# Patient Record
Sex: FEMALE | Race: ASIAN
[De-identification: names, ages, dates, MRNs, and addresses within clinical notes are randomized per-mention and may not be internally consistent; named-entity substitution may affect disease eponyms.]

---

## 2017-11-06 ENCOUNTER — HOSPITAL ENCOUNTER (INPATIENT)
Dept: HOSPITAL 54 - ER | Age: 82
LOS: 5 days | Discharge: NURSING, REHAB OR LONG-TERM CARE | DRG: 264 | End: 2017-11-11
Attending: INTERNAL MEDICINE | Admitting: INTERNAL MEDICINE
Payer: MEDICARE

## 2017-11-06 VITALS — DIASTOLIC BLOOD PRESSURE: 92 MMHG | SYSTOLIC BLOOD PRESSURE: 158 MMHG

## 2017-11-06 VITALS — SYSTOLIC BLOOD PRESSURE: 145 MMHG | DIASTOLIC BLOOD PRESSURE: 84 MMHG

## 2017-11-06 VITALS — BODY MASS INDEX: 17.67 KG/M2 | HEIGHT: 60 IN | WEIGHT: 90 LBS

## 2017-11-06 VITALS — SYSTOLIC BLOOD PRESSURE: 142 MMHG | DIASTOLIC BLOOD PRESSURE: 78 MMHG

## 2017-11-06 DIAGNOSIS — E11.52: Primary | ICD-10-CM

## 2017-11-06 DIAGNOSIS — F39: ICD-10-CM

## 2017-11-06 DIAGNOSIS — G40.909: ICD-10-CM

## 2017-11-06 DIAGNOSIS — Z79.4: ICD-10-CM

## 2017-11-06 DIAGNOSIS — M81.0: ICD-10-CM

## 2017-11-06 DIAGNOSIS — M24.569: ICD-10-CM

## 2017-11-06 DIAGNOSIS — R53.2: ICD-10-CM

## 2017-11-06 DIAGNOSIS — Z79.899: ICD-10-CM

## 2017-11-06 DIAGNOSIS — I25.10: ICD-10-CM

## 2017-11-06 DIAGNOSIS — H26.9: ICD-10-CM

## 2017-11-06 DIAGNOSIS — E43: ICD-10-CM

## 2017-11-06 DIAGNOSIS — Z86.73: ICD-10-CM

## 2017-11-06 DIAGNOSIS — G92: ICD-10-CM

## 2017-11-06 DIAGNOSIS — F29: ICD-10-CM

## 2017-11-06 DIAGNOSIS — Z88.8: ICD-10-CM

## 2017-11-06 DIAGNOSIS — I10: ICD-10-CM

## 2017-11-06 DIAGNOSIS — D64.9: ICD-10-CM

## 2017-11-06 DIAGNOSIS — F03.90: ICD-10-CM

## 2017-11-06 DIAGNOSIS — Z51.5: ICD-10-CM

## 2017-11-06 LAB
ALBUMIN SERPL BCP-MCNC: 3.3 G/DL (ref 3.4–5)
ALP SERPL-CCNC: 87 U/L (ref 46–116)
ALT SERPL W P-5'-P-CCNC: 23 U/L (ref 12–78)
APTT PPP: 27 SEC (ref 23–34)
AST SERPL W P-5'-P-CCNC: 28 U/L (ref 15–37)
BASOPHILS # BLD AUTO: 0.1 /CMM (ref 0–0.2)
BASOPHILS NFR BLD AUTO: 1.6 % (ref 0–2)
BILIRUB DIRECT SERPL-MCNC: 0.1 MG/DL (ref 0–0.2)
BILIRUB SERPL-MCNC: 0.5 MG/DL (ref 0.2–1)
BUN SERPL-MCNC: 16 MG/DL (ref 7–18)
CALCIUM SERPL-MCNC: 9.8 MG/DL (ref 8.5–10.1)
CHLORIDE SERPL-SCNC: 101 MMOL/L (ref 98–107)
CO2 SERPL-SCNC: 33 MMOL/L (ref 21–32)
CREAT SERPL-MCNC: 0.6 MG/DL (ref 0.6–1.3)
EOSINOPHIL # BLD AUTO: 0.2 /CMM (ref 0–0.7)
EOSINOPHIL NFR BLD AUTO: 4.1 % (ref 0–6)
GLUCOSE SERPL-MCNC: 183 MG/DL (ref 74–106)
HCT VFR BLD AUTO: 38 % (ref 33–45)
HGB BLD-MCNC: 12.5 G/DL (ref 11.5–14.8)
INR PPP: 0.95 (ref 0.87–1.13)
LYMPHOCYTES NFR BLD AUTO: 1.4 /CMM (ref 0.8–4.8)
LYMPHOCYTES NFR BLD AUTO: 24 % (ref 20–44)
MCH RBC QN AUTO: 30 PG (ref 26–33)
MCHC RBC AUTO-ENTMCNC: 33 G/DL (ref 31–36)
MCV RBC AUTO: 91 FL (ref 82–100)
MONOCYTES NFR BLD AUTO: 0.2 /CMM (ref 0.1–1.3)
MONOCYTES NFR BLD AUTO: 4.1 % (ref 2–12)
NEUTROPHILS # BLD AUTO: 3.9 /CMM (ref 1.8–8.9)
NEUTROPHILS NFR BLD AUTO: 66.2 % (ref 43–81)
PLATELET # BLD AUTO: 297 /CMM (ref 150–450)
POTASSIUM SERPL-SCNC: 4.2 MMOL/L (ref 3.5–5.1)
PROT SERPL-MCNC: 7.6 G/DL (ref 6.4–8.2)
PROTHROMBIN TIME: 9.9 SECS (ref 9.5–12.7)
RBC # BLD AUTO: 4.13 MIL/UL (ref 4–5.2)
RDW COEFFICIENT OF VARIATION: 12.4 (ref 11.5–15)
SODIUM SERPL-SCNC: 140 MMOL/L (ref 136–145)
TROPONIN I SERPL-MCNC: < 0.017 NG/ML (ref 0–0.06)
WBC NRBC COR # BLD AUTO: 5.8 K/UL (ref 4.3–11)

## 2017-11-06 PROCEDURE — A6402 STERILE GAUZE <= 16 SQ IN: HCPCS

## 2017-11-06 PROCEDURE — A4216 STERILE WATER/SALINE, 10 ML: HCPCS

## 2017-11-06 PROCEDURE — A6253 ABSORPT DRG > 48 SQ IN W/O B: HCPCS

## 2017-11-06 PROCEDURE — A4606 OXYGEN PROBE USED W OXIMETER: HCPCS

## 2017-11-06 PROCEDURE — 05H633Z INSERTION OF INFUSION DEVICE INTO LEFT SUBCLAVIAN VEIN, PERCUTANEOUS APPROACH: ICD-10-PCS | Performed by: INTERNAL MEDICINE

## 2017-11-06 PROCEDURE — 0JBP0ZZ EXCISION OF LEFT LOWER LEG SUBCUTANEOUS TISSUE AND FASCIA, OPEN APPROACH: ICD-10-PCS | Performed by: PODIATRIST

## 2017-11-06 PROCEDURE — A6403 STERILE GAUZE>16 <= 48 SQ IN: HCPCS

## 2017-11-06 PROCEDURE — Z7610: HCPCS

## 2017-11-06 RX ADMIN — LEVETIRACETAM SCH MG: 250 TABLET, FILM COATED ORAL at 20:11

## 2017-11-06 RX ADMIN — Medication SCH MLS/HR: at 21:07

## 2017-11-06 RX ADMIN — Medication SCH ML: at 19:10

## 2017-11-06 RX ADMIN — CARVEDILOL SCH MG: 3.12 TABLET, FILM COATED ORAL at 19:10

## 2017-11-06 RX ADMIN — SIMVASTATIN SCH MG: 10 TABLET, FILM COATED ORAL at 21:07

## 2017-11-06 RX ADMIN — DEXTROSE MONOHYDRATE SCH MLS/HR: 50 INJECTION, SOLUTION INTRAVENOUS at 20:18

## 2017-11-06 RX ADMIN — Medication SCH MG: at 21:07

## 2017-11-06 RX ADMIN — INSULIN DETEMIR SCH UNIT: 100 INJECTION, SOLUTION SUBCUTANEOUS at 21:11

## 2017-11-06 RX ADMIN — LIDOCAINE HYDROCHLORIDE ONE MG: 10 INJECTION, SOLUTION INFILTRATION; PERINEURAL at 15:30

## 2017-11-06 RX ADMIN — HYDROMORPHONE HYDROCHLORIDE PRN MG: 1 INJECTION, SOLUTION INTRAMUSCULAR; INTRAVENOUS; SUBCUTANEOUS at 21:07

## 2017-11-06 RX ADMIN — LORAZEPAM PRN MG: 2 INJECTION INTRAMUSCULAR; INTRAVENOUS at 16:55

## 2017-11-06 NOTE — NUR
RN NOTE 

PATIENT ARRIVE TO THE UNIT ON GURNEY A/0X1-2  ITCHING AND SCRATCHING SCREAMING WITH 
UNIDENTIFIABLE LANGUAGE. PATIENT VITALS ASSESSED AND NO SOB NOTED , PAIN UNABLE TO BE SCALED 
HOWEVER THE PATIENT IS GRIMACING REAPEDALY AND YELLS WHEN LOWER LEFT EXTREMITY IS SLIGHTLY 
MOVED OR REPOSITIONED PATIENT IS ABLE TO MOVE HER SELF HOWEVER UNABLE TO FOLLOW COMMANDS 
PANT APPEAR ANIOUS AND THRUSH / WHITE COATING NOTED ON PATIENTS TONGUE PATIENT HAS MULTIPLE 
SKIN ISSUE : LEFT LOWER LEG WET GANGRENE [id number] TO DETERMINE DEPTH. PATIENT SCRACTHING 
SKIN ALL OVER AND BODY CAUSING FURTHER SKIN IRRITATION. PICTURES TAKEN AND PLACE IN THE 
CHART , WOUND CARE NURSE BLAINE AND HUSEYIN PUTNAM HELPED ASSIST WITH INITIAL INTAKE. PATIENT SAFTEY 
MEASURES IN PLACE RN SITTING AT BEDSIDE

## 2017-11-06 NOTE — NUR
RN CLOSING NOTE 

PATIENT  IN BED RA NO SOB NOTED PATIENT GIVEN PM B/P MEDICATION DAYSHIFT STILL AWAITING 
FURTHER MEDICATION VIA PHARMACY RN WILL ENDORSE FURTHER CARE TO PM RN , PATIENT IS RESTING 
AFTER DEBRIBMENT MEAL TRAY ON BEDSIDE TABLE TO GIVE TO PATIENT AFTER SHE AWAKENS

## 2017-11-06 NOTE — NUR
WILLIAN FROM [facility] DT RIGHT LEG OPEN WOUND POSSIBLE GANGRENE, 
PATIENT WAS SENT BY MD DAILEY.  RIGHT LEG WOUND NOTED WITH GREENISH 
DISCHARGE. SKIN IS WARM TO TOUCH AND NON DIAPHORETIC. AFEBRILE. VSS

## 2017-11-06 NOTE — NUR
MS RN NOTE



IV TO RHAND LEAKING. NEW IV STARTED #22 SHAKA. PATIENT TOLERATED WELL. WILL CONTINUE TO 
MONITOR.

## 2017-11-06 NOTE — NUR
MS RN NOTE



RECEIVED PATIENT AWAKE IN BED. VERY CONFUSED Romanian SPEAKING. NO DISTRESS NOTED. IV SITE TO 
RIGHT HAND INTACT, WITH FLUIDS RUNNING AS ORDERED.DRESSING TO LEFT LEG C/D/I. BED LOCKED AND 
IN LOWEST POSITION. SIDE RAILS UP, CALL LIGHT WITHIN REACH. WILL CONTINUE TO MONITOR.

## 2017-11-06 NOTE — NUR
MS RN NOTE



PATIENT HAS FACIAL GRIMACING. SON AT BEDSIDE. STATES THAT PATIENT IS HAVING 8/10 PAIN TO 
LEFT LEG DEBRIDEMENT SITE. NEW ORDER RECEIVED FOR DILAUDID 0.5MG Q8H FOR SEVERE PAIN 
RECEIVED AND CARRIED OUT. ADMINISTERED DILAUDID TO PATIENT SAFELY AS ORDERED. WILL CONTINUE 
TO MONITOR.

## 2017-11-07 VITALS — DIASTOLIC BLOOD PRESSURE: 70 MMHG | SYSTOLIC BLOOD PRESSURE: 178 MMHG

## 2017-11-07 VITALS — DIASTOLIC BLOOD PRESSURE: 58 MMHG | SYSTOLIC BLOOD PRESSURE: 173 MMHG

## 2017-11-07 VITALS — SYSTOLIC BLOOD PRESSURE: 153 MMHG | DIASTOLIC BLOOD PRESSURE: 62 MMHG

## 2017-11-07 VITALS — DIASTOLIC BLOOD PRESSURE: 73 MMHG | SYSTOLIC BLOOD PRESSURE: 134 MMHG

## 2017-11-07 LAB
BUN SERPL-MCNC: 16 MG/DL (ref 7–18)
CALCIUM SERPL-MCNC: 8.7 MG/DL (ref 8.5–10.1)
CHLORIDE SERPL-SCNC: 102 MMOL/L (ref 98–107)
CO2 SERPL-SCNC: 31 MMOL/L (ref 21–32)
CREAT SERPL-MCNC: 0.9 MG/DL (ref 0.6–1.3)
GLUCOSE SERPL-MCNC: 144 MG/DL (ref 74–106)
POTASSIUM SERPL-SCNC: 4.4 MMOL/L (ref 3.5–5.1)
SODIUM SERPL-SCNC: 141 MMOL/L (ref 136–145)

## 2017-11-07 RX ADMIN — Medication SCH MG: at 21:16

## 2017-11-07 RX ADMIN — Medication SCH TAB: at 09:15

## 2017-11-07 RX ADMIN — Medication SCH ML: at 14:37

## 2017-11-07 RX ADMIN — INSULIN DETEMIR SCH UNIT: 100 INJECTION, SOLUTION SUBCUTANEOUS at 18:33

## 2017-11-07 RX ADMIN — DEXTROSE MONOHYDRATE SCH MLS/HR: 50 INJECTION, SOLUTION INTRAVENOUS at 06:30

## 2017-11-07 RX ADMIN — SIMVASTATIN SCH MG: 10 TABLET, FILM COATED ORAL at 21:16

## 2017-11-07 RX ADMIN — LORAZEPAM PRN MG: 2 INJECTION INTRAMUSCULAR; INTRAVENOUS at 22:24

## 2017-11-07 RX ADMIN — HYDROMORPHONE HYDROCHLORIDE PRN MG: 1 INJECTION, SOLUTION INTRAMUSCULAR; INTRAVENOUS; SUBCUTANEOUS at 22:22

## 2017-11-07 RX ADMIN — POVIDONE-IODINE SCH GM: 100 OINTMENT TOPICAL at 11:07

## 2017-11-07 RX ADMIN — LEVETIRACETAM SCH MG: 250 TABLET, FILM COATED ORAL at 21:20

## 2017-11-07 RX ADMIN — Medication SCH ML: at 11:04

## 2017-11-07 RX ADMIN — LEVETIRACETAM SCH MG: 250 TABLET, FILM COATED ORAL at 09:15

## 2017-11-07 RX ADMIN — CARVEDILOL SCH MG: 3.12 TABLET, FILM COATED ORAL at 09:16

## 2017-11-07 RX ADMIN — Medication SCH MLS/HR: at 13:00

## 2017-11-07 RX ADMIN — Medication SCH MLS/HR: at 04:08

## 2017-11-07 RX ADMIN — Medication SCH OZ: at 09:19

## 2017-11-07 RX ADMIN — DEXTROSE MONOHYDRATE SCH MLS/HR: 50 INJECTION, SOLUTION INTRAVENOUS at 22:28

## 2017-11-07 RX ADMIN — CARVEDILOL SCH MG: 3.12 TABLET, FILM COATED ORAL at 17:04

## 2017-11-07 RX ADMIN — INSULIN DETEMIR SCH UNIT: 100 INJECTION, SOLUTION SUBCUTANEOUS at 06:31

## 2017-11-07 RX ADMIN — Medication SCH MLS/HR: at 23:03

## 2017-11-07 RX ADMIN — LISINOPRIL SCH MG: 10 TABLET ORAL at 09:16

## 2017-11-07 RX ADMIN — Medication SCH ML: at 17:02

## 2017-11-07 RX ADMIN — POVIDONE-IODINE SCH GM: 100 OINTMENT TOPICAL at 17:01

## 2017-11-07 NOTE — NUR
PT. PULLED OUT IV,CHARGE RN NOTIFIED,SUPERVISOR CALLED FOR MIDLINE INSERTION-MEANWHILE PT. 
NEEDS ANTIBIOTICS-SO FLOOR RN ATTEMPTED PERIPHERAL INSERTION-TO NO AVAIL.

## 2017-11-07 NOTE — NUR
RN NOTES,



PATIENT RECEIVED IN BED SLEEPING AT THIS TIME, RA BREATHING EVEN AND UNLABORED NO S/S OF 
PAIN OR DISCOMFORT AT THIS TIME.  AFEBRILE, AT THIS TIME, NO PIV LINE IN PLACE PER RN DAY 
SHIFT A NURSE IS COMING TO INSERT A MIDLINE. NOTED WITH MULTIPLE CONTRACTURES. BED LOCKED IN 
LOWEST POSITION, NOTED CLEAN AND DRY AT THIS TIME. DRESSING IN LEFT FOOT DRY AND CLEAN, NO 
BLEEDING NOTED. CALL LIGHT W/I REACH, WILL CONTINUE TO MONITOR CLOSELY.

## 2017-11-07 NOTE — NUR
Patient resides at [facility]  [phone], he is totally dependent with adl's and 
bedfast most of the time. Current plan is to dc back to SNF once discharge. 


-------------------------------------------------------------------------------

Addendum: 11/07/17 at 1347 by VITA LEE RN

-------------------------------------------------------------------------------

Amended: Links added.

## 2017-11-07 NOTE — NUR
WOUND CARE CONSULT: PT PRESENTS WITH LEFT LOWER LEG NECROTIC WOUND WITH ODOR AND PURULENT 
DRAINAGE. PT FOLLOWED BY DR DUEÑAS. DEFER TO DPM FOR  TREATMENT PLAN OF LOWER 
EXTREMITIES. PT IS VERY THIN AND BONY WITH MULTIPLE AREAS OF SCARRING AND SCRATCH MARKS. PT 
NOTED TO SCRATCH HER SKIN AT TIMES. PT ON ALICIA ISOFLEX LOW AIRLOSS BED. ALL SKIN 
PROTECTION MEASURES IN PLACE AND DISCUSSED WITH NURSING STAFF. WILL SEE PRN. MD IN AGREEMENT 
WITH PLAN OF CARE.

## 2017-11-08 VITALS — DIASTOLIC BLOOD PRESSURE: 62 MMHG | SYSTOLIC BLOOD PRESSURE: 149 MMHG

## 2017-11-08 VITALS — SYSTOLIC BLOOD PRESSURE: 156 MMHG | DIASTOLIC BLOOD PRESSURE: 52 MMHG

## 2017-11-08 VITALS — DIASTOLIC BLOOD PRESSURE: 80 MMHG | SYSTOLIC BLOOD PRESSURE: 164 MMHG

## 2017-11-08 VITALS — DIASTOLIC BLOOD PRESSURE: 71 MMHG | SYSTOLIC BLOOD PRESSURE: 163 MMHG

## 2017-11-08 VITALS — SYSTOLIC BLOOD PRESSURE: 149 MMHG | DIASTOLIC BLOOD PRESSURE: 62 MMHG

## 2017-11-08 LAB
BUN SERPL-MCNC: 15 MG/DL (ref 7–18)
CALCIUM SERPL-MCNC: 8.8 MG/DL (ref 8.5–10.1)
CHLORIDE SERPL-SCNC: 104 MMOL/L (ref 98–107)
CO2 SERPL-SCNC: 33 MMOL/L (ref 21–32)
CREAT SERPL-MCNC: 0.7 MG/DL (ref 0.6–1.3)
GLUCOSE SERPL-MCNC: 47 MG/DL (ref 74–106)
POTASSIUM SERPL-SCNC: 3.4 MMOL/L (ref 3.5–5.1)
SODIUM SERPL-SCNC: 144 MMOL/L (ref 136–145)

## 2017-11-08 RX ADMIN — LACTOBACILLUS TAB SCH EACH: TAB at 18:21

## 2017-11-08 RX ADMIN — POTASSIUM CHLORIDE SCH MLS/HR: 200 INJECTION, SOLUTION INTRAVENOUS at 13:46

## 2017-11-08 RX ADMIN — Medication SCH MLS/HR: at 06:17

## 2017-11-08 RX ADMIN — LORAZEPAM PRN MG: 2 INJECTION INTRAMUSCULAR; INTRAVENOUS at 16:20

## 2017-11-08 RX ADMIN — Medication SCH DROP: at 16:21

## 2017-11-08 RX ADMIN — HYDROMORPHONE HYDROCHLORIDE PRN MG: 1 INJECTION, SOLUTION INTRAMUSCULAR; INTRAVENOUS; SUBCUTANEOUS at 20:12

## 2017-11-08 RX ADMIN — SODIUM CHLORIDE SCH MLS/HR: 9 INJECTION, SOLUTION INTRAVENOUS at 18:22

## 2017-11-08 RX ADMIN — INSULIN DETEMIR SCH UNIT: 100 INJECTION, SOLUTION SUBCUTANEOUS at 07:30

## 2017-11-08 RX ADMIN — Medication SCH TAB: at 09:55

## 2017-11-08 RX ADMIN — LEVETIRACETAM SCH MG: 250 TABLET, FILM COATED ORAL at 09:55

## 2017-11-08 RX ADMIN — Medication SCH MLS/HR: at 13:06

## 2017-11-08 RX ADMIN — POVIDONE-IODINE SCH GM: 100 OINTMENT TOPICAL at 10:00

## 2017-11-08 RX ADMIN — CARVEDILOL SCH MG: 3.12 TABLET, FILM COATED ORAL at 09:56

## 2017-11-08 RX ADMIN — LISINOPRIL SCH MG: 10 TABLET ORAL at 09:55

## 2017-11-08 RX ADMIN — POVIDONE-IODINE SCH GM: 100 OINTMENT TOPICAL at 16:28

## 2017-11-08 RX ADMIN — SIMVASTATIN SCH MG: 10 TABLET, FILM COATED ORAL at 21:32

## 2017-11-08 RX ADMIN — Medication SCH OZ: at 09:57

## 2017-11-08 RX ADMIN — Medication SCH DROP: at 09:57

## 2017-11-08 RX ADMIN — Medication SCH DROP: at 13:07

## 2017-11-08 RX ADMIN — Medication SCH MG: at 21:32

## 2017-11-08 RX ADMIN — INSULIN DETEMIR SCH UNIT: 100 INJECTION, SOLUTION SUBCUTANEOUS at 18:00

## 2017-11-08 RX ADMIN — LEVETIRACETAM SCH MG: 250 TABLET, FILM COATED ORAL at 20:12

## 2017-11-08 RX ADMIN — POTASSIUM CHLORIDE SCH MLS/HR: 200 INJECTION, SOLUTION INTRAVENOUS at 15:10

## 2017-11-08 RX ADMIN — CARVEDILOL SCH MG: 3.12 TABLET, FILM COATED ORAL at 16:20

## 2017-11-08 RX ADMIN — DEXTROSE MONOHYDRATE SCH MLS/HR: 50 INJECTION, SOLUTION INTRAVENOUS at 01:20

## 2017-11-08 NOTE — NUR
RN EOS NOTE;

PT REMAINED STABLE DURING THE SHIFT. NO ANY DISTRESS NOTED, ALL DUE MEDS TOLERATED WELL. 
ENDORSE TO NEXT SHIFT RN FOR CONTINUITY OF CARE.

## 2017-11-08 NOTE — NUR
RN NOTE;

RECEIVED RESTRAINTS ORDER FROM DR MILLIGAN FOR B/L MITTENS DUE TO PATIENT SCRATCHING AND 
PULLING OUT LINES NECESSARY FOR TREATMENT , WILL CHECK FOR CIRCULATION .

## 2017-11-08 NOTE — NUR
RN NOTES



PATIENT ON BED. BREATHING EVEN AND UNLABORED. AOX1 TO NAME, CONFUSED MOST OF THE TIME.  WITH 
IV SITE ON RFA G22 AND SHAKA MIDLINE INTACT AND PATENT AND FLUSHED WELL.LEFT LEG ULCER  SOAKED 
WITH  WOUND DRAINAGE AND SUNK FROM THE WOUND CHANGED AS ORDERED TX. PT IS RESTLESS ON BED, 
SCREAMED AND YELLED WHILE TAKING VS. SBP SHOWS 180'S AT 1940 PM. AND [facility] 
 PAIN MEDICINE GIVEN AS ORDERED  DUE TO LEFT LOWER LEG GANGRENE. AND PT STARTED TO CALM 
DOWN. BP RECHECK AFTER  15 MINUTES EFFECTIVE SBP NOW . PT ASLEEP AT THIS TIME  WITH 
ADEQ. OXYGENATION. OFFLOADED EXT WITH PILLOWS. BILATERAL MITTENS CONTINUE TO RENDERED DUE TO 
EPISODE OF PULLING OUT WHEN AWAKE. KEP CLEAN AND DRY WILL CONTINUE TO MONITOR.

## 2017-11-08 NOTE — NUR
RN NOTE; HELD LEVEMIR

INSULIN LEVEMIR 12 UNIT HELD DUE TO EPISODE OF HYPOGLYCEMIA BS- 52 MG/DL IN AM , DEXTROSE 
50% IV GIVEN BY PREVIOUS SHIFT RN , RECHECKED BS - 261 MG/DL . WILL CONTINUE TO MONITOR .

## 2017-11-08 NOTE — NUR
RN NOTES



PT REMAINED IN STABLE CONDITION THROUGHOUT THE SHIFT AFTER PRN MEDICINE GIVEN AS ORDERED. PT 
ASLEEP WELL  BREATHING EVEN AND UNLABORED. NO FACIAL COMPLAIN OF PAIN AT 0655 OSIEL  FROM LAB 
CALLED AND REPORT REGARDING THE CRITICAL VALUE OF GLUCOSE 47 MG/DL. MANUAL ACCUCHECK DONE  
BS 52 MG/DL.  OFFERED JUICE AND SNACK BU PT REFUSED TO EAT. RESPONSIVE SCREAMING, AND 
YELLING WHEN TRYING TO GIVE FOOD OR JUICE. PAGED MD  AND WAITED TO CALL BACK.  REPORT TO 
CHARGE NURSE AND  D50 GIVEN AS PROTOCOL. ENDORSED TO AM NURSE TO FOLLOW UP MD AND TO RECHECK 
BS.

## 2017-11-09 VITALS — DIASTOLIC BLOOD PRESSURE: 61 MMHG | SYSTOLIC BLOOD PRESSURE: 131 MMHG

## 2017-11-09 VITALS — DIASTOLIC BLOOD PRESSURE: 76 MMHG | SYSTOLIC BLOOD PRESSURE: 147 MMHG

## 2017-11-09 VITALS — SYSTOLIC BLOOD PRESSURE: 159 MMHG | DIASTOLIC BLOOD PRESSURE: 64 MMHG

## 2017-11-09 LAB
BUN SERPL-MCNC: 14 MG/DL (ref 7–18)
CALCIUM SERPL-MCNC: 8.9 MG/DL (ref 8.5–10.1)
CHLORIDE SERPL-SCNC: 103 MMOL/L (ref 98–107)
CO2 SERPL-SCNC: 29 MMOL/L (ref 21–32)
CREAT SERPL-MCNC: 0.8 MG/DL (ref 0.6–1.3)
GLUCOSE SERPL-MCNC: 137 MG/DL (ref 74–106)
POTASSIUM SERPL-SCNC: 3.9 MMOL/L (ref 3.5–5.1)
SODIUM SERPL-SCNC: 143 MMOL/L (ref 136–145)

## 2017-11-09 RX ADMIN — Medication SCH DROP: at 18:46

## 2017-11-09 RX ADMIN — Medication SCH DROP: at 10:33

## 2017-11-09 RX ADMIN — INSULIN DETEMIR SCH UNIT: 100 INJECTION, SOLUTION SUBCUTANEOUS at 07:30

## 2017-11-09 RX ADMIN — LEVETIRACETAM SCH MG: 250 TABLET, FILM COATED ORAL at 10:30

## 2017-11-09 RX ADMIN — SIMVASTATIN SCH MG: 10 TABLET, FILM COATED ORAL at 21:32

## 2017-11-09 RX ADMIN — SODIUM CHLORIDE SCH MLS/HR: 9 INJECTION, SOLUTION INTRAVENOUS at 03:53

## 2017-11-09 RX ADMIN — LEVETIRACETAM SCH MG: 250 TABLET, FILM COATED ORAL at 21:32

## 2017-11-09 RX ADMIN — Medication SCH DROP: at 13:28

## 2017-11-09 RX ADMIN — POVIDONE-IODINE SCH GM: 100 OINTMENT TOPICAL at 18:46

## 2017-11-09 RX ADMIN — Medication SCH OZ: at 10:33

## 2017-11-09 RX ADMIN — LEVOFLOXACIN SCH MG: 250 TABLET, FILM COATED ORAL at 18:45

## 2017-11-09 RX ADMIN — HYDROMORPHONE HYDROCHLORIDE PRN MG: 1 INJECTION, SOLUTION INTRAMUSCULAR; INTRAVENOUS; SUBCUTANEOUS at 19:50

## 2017-11-09 RX ADMIN — LACTOBACILLUS TAB SCH EACH: TAB at 13:28

## 2017-11-09 RX ADMIN — LACTOBACILLUS TAB SCH EACH: TAB at 10:31

## 2017-11-09 RX ADMIN — SODIUM CHLORIDE SCH MLS/HR: 9 INJECTION, SOLUTION INTRAVENOUS at 18:44

## 2017-11-09 RX ADMIN — SODIUM CHLORIDE SCH MLS/HR: 9 INJECTION, SOLUTION INTRAVENOUS at 11:44

## 2017-11-09 RX ADMIN — Medication SCH MG: at 21:32

## 2017-11-09 RX ADMIN — INSULIN DETEMIR SCH UNIT: 100 INJECTION, SOLUTION SUBCUTANEOUS at 18:47

## 2017-11-09 RX ADMIN — CARVEDILOL SCH MG: 3.12 TABLET, FILM COATED ORAL at 10:31

## 2017-11-09 RX ADMIN — LISINOPRIL SCH MG: 10 TABLET ORAL at 10:31

## 2017-11-09 RX ADMIN — Medication SCH TAB: at 10:30

## 2017-11-09 RX ADMIN — CARVEDILOL SCH MG: 3.12 TABLET, FILM COATED ORAL at 18:45

## 2017-11-09 RX ADMIN — POVIDONE-IODINE SCH GM: 100 OINTMENT TOPICAL at 10:32

## 2017-11-09 RX ADMIN — LACTOBACILLUS TAB SCH EACH: TAB at 18:45

## 2017-11-09 NOTE — NUR
NAVYA MS RN AM NOTES,



PATIENT RECEIVED IN BED AWAKE AND RESTLESS IN BED.RA BREATHING EVEN AND UNLABORED NO S/S OF 
PAIN OR DISCOMFORT AT THIS TIME.  AFEBRILE, AT THIS TIME, NOTED WITH MULTIPLE CONTRACTURES. 
BED LOCKED IN LOWEST POSITION, NOTED CLEAN AND DRY AT THIS TIME. DRESSING IN LEFT FOOT DRY 
AND CLEAN, NO BLEEDING NOTED. CALL LIGHT W/IN REACH, WILL CONTINUE TO MONITOR CLOSELY.

## 2017-11-09 NOTE — NUR
PT RESTING IN BED WITH NO S/S OF PAIN OR DISTRESS.RESTLESS EPISODE OCCASIONALLY.WITH 
BILATERAL MITTENS ON AND CHECKED FOR CIRCULATION EVERY TWO HRS.

## 2017-11-09 NOTE — NUR
RN/MS NOTES:



RECEIVED PATIENT AWAKE IN BED. VERY CONFUSED Slovak SPEAKING JUST MUMBLES. NO FACIAL BONILLA 
OR MOANING NOTED.  NOT IN ANY RESPIRATORY DISTRESS NOTED. IV SITE TO RIGHT FA INTACT AND 
PATENT W/ NO S/S OF INFECTION/INFILTRATION  NOTED. HAS SHAKA MID LINE INTACT. DRESSING TO LEFT 
LEG C/D/I. BED LOCKED AND IN LOWEST POSITION. SIDE RAILS UP, CALL LIGHT WITHIN REACH. HAS 
BILATERAL MITTENS INPLACE. WILL CONTINUE TO MONITOR.

## 2017-11-09 NOTE — NUR
RN NOTES



PATIENT ASLEEP WELL ON BED. BREATHING EVEN AND UNLABORED. NO SIGNIFICANT CHANGES SHOWS.  
WOUND DRESSING CHANGE AS ORDERED TX. AFEBRILE. VS CONTROLLED WITH MEDS. STILL WITH POOR 
APPETITE OFFERED FOOD INEFFECTIVE. TURNED AND REPOSITIONED  Q2H AND PRN AS PT COMFORTABLE. 
BILATERAL MITTENS IN PLACED. BED LOCKED AND PLACED IN LOWEST POSSIBLE POSITION. KEPT CLEAN 
AND DRY. WILL ENDORSED CONTINUITY OF CARE TO AM NURSE. AND TO FOLLOW UP MD REGARDING THE 
DISCHARGE OF PATIENT PER SON

## 2017-11-10 VITALS — SYSTOLIC BLOOD PRESSURE: 163 MMHG | DIASTOLIC BLOOD PRESSURE: 41 MMHG

## 2017-11-10 VITALS — DIASTOLIC BLOOD PRESSURE: 56 MMHG | SYSTOLIC BLOOD PRESSURE: 127 MMHG

## 2017-11-10 VITALS — DIASTOLIC BLOOD PRESSURE: 53 MMHG | SYSTOLIC BLOOD PRESSURE: 125 MMHG

## 2017-11-10 VITALS — SYSTOLIC BLOOD PRESSURE: 148 MMHG | DIASTOLIC BLOOD PRESSURE: 80 MMHG

## 2017-11-10 LAB
BUN SERPL-MCNC: 20 MG/DL (ref 7–18)
CALCIUM SERPL-MCNC: 9 MG/DL (ref 8.5–10.1)
CHLORIDE SERPL-SCNC: 103 MMOL/L (ref 98–107)
CO2 SERPL-SCNC: 32 MMOL/L (ref 21–32)
CREAT SERPL-MCNC: 0.8 MG/DL (ref 0.6–1.3)
GLUCOSE SERPL-MCNC: 162 MG/DL (ref 74–106)
POTASSIUM SERPL-SCNC: 3.7 MMOL/L (ref 3.5–5.1)
SODIUM SERPL-SCNC: 142 MMOL/L (ref 136–145)

## 2017-11-10 RX ADMIN — INSULIN DETEMIR SCH UNIT: 100 INJECTION, SOLUTION SUBCUTANEOUS at 07:30

## 2017-11-10 RX ADMIN — SIMVASTATIN SCH MG: 10 TABLET, FILM COATED ORAL at 21:09

## 2017-11-10 RX ADMIN — POVIDONE-IODINE SCH GM: 100 OINTMENT TOPICAL at 17:25

## 2017-11-10 RX ADMIN — INSULIN DETEMIR SCH UNIT: 100 INJECTION, SOLUTION SUBCUTANEOUS at 17:43

## 2017-11-10 RX ADMIN — Medication SCH DROP: at 09:52

## 2017-11-10 RX ADMIN — SODIUM CHLORIDE SCH MLS/HR: 9 INJECTION, SOLUTION INTRAVENOUS at 17:23

## 2017-11-10 RX ADMIN — LEVETIRACETAM SCH MG: 250 TABLET, FILM COATED ORAL at 09:58

## 2017-11-10 RX ADMIN — Medication SCH DROP: at 13:07

## 2017-11-10 RX ADMIN — LEVOFLOXACIN SCH MG: 250 TABLET, FILM COATED ORAL at 17:25

## 2017-11-10 RX ADMIN — SODIUM CHLORIDE SCH MLS/HR: 9 INJECTION, SOLUTION INTRAVENOUS at 09:57

## 2017-11-10 RX ADMIN — CARVEDILOL SCH MG: 3.12 TABLET, FILM COATED ORAL at 17:26

## 2017-11-10 RX ADMIN — LEVETIRACETAM SCH MG: 250 TABLET, FILM COATED ORAL at 21:09

## 2017-11-10 RX ADMIN — LISINOPRIL SCH MG: 10 TABLET ORAL at 09:59

## 2017-11-10 RX ADMIN — POVIDONE-IODINE SCH GM: 100 OINTMENT TOPICAL at 09:51

## 2017-11-10 RX ADMIN — Medication SCH TAB: at 09:58

## 2017-11-10 RX ADMIN — LACTOBACILLUS TAB SCH EACH: TAB at 09:58

## 2017-11-10 RX ADMIN — Medication SCH DROP: at 17:25

## 2017-11-10 RX ADMIN — Medication SCH MG: at 21:09

## 2017-11-10 RX ADMIN — Medication SCH OZ: at 09:51

## 2017-11-10 RX ADMIN — SODIUM CHLORIDE SCH MLS/HR: 9 INJECTION, SOLUTION INTRAVENOUS at 01:10

## 2017-11-10 RX ADMIN — CARVEDILOL SCH MG: 3.12 TABLET, FILM COATED ORAL at 09:59

## 2017-11-10 RX ADMIN — LACTOBACILLUS TAB SCH EACH: TAB at 13:07

## 2017-11-10 RX ADMIN — LACTOBACILLUS TAB SCH EACH: TAB at 17:25

## 2017-11-10 NOTE — NUR
RN OPEN NOTES



RECEIVED REPORT FROM NIGHT SHIFT NURSE. WILL CONTINUE TO MONITOR AND ASSESS PATIEN THROUGH 
OUT MY SHIFT

## 2017-11-10 NOTE — NUR
RN CLOSING NOTES



PATIENT IS IN BED. ALERT AND ORIENTED TO SELF ONLY. NO SIGNS AND SYMPTOMS OF DISTRESS OR 
PAIN. IV SITE IS INTACT AND PATENT. AL NURSING CARE ATTENDED. PATIENT KEPT CLEAN AND DRY. 
ENDORSED TO NIGHT SHIFT NURSE.

## 2017-11-10 NOTE — NUR
RN/MS NOTES:



PT. IN BED SLEEPING W/ RESPIRATIONS EVEN AND UNLABORED. NO FACIAL GRIMACES OR MOANING NOTED. 
REPORT GIVEN TO NEXT SHIFT NURSE FOR CONTINUE OF CARE.

## 2017-11-10 NOTE — NUR
RN/MS NOTES:



RECEIVED PATIENT AWAKE IN BED. VERY CONFUSED Tajik SPEAKING JUST MUMBLES. NO FACIAL BONILLA 
OR MOANING NOTED.  NOT IN ANY RESPIRATORY DISTRESS NOTED. IV SITE TO RIGHT FA INTACT AND 
PATENT W/ NO S/S OF INFECTION/INFILTRATION  NOTED. HAS SHAKA MID LINE INTACT. DRESSING TO LEFT 
LEG C/D/I. BED LOCKED AND IN LOWEST POSITION. SIDE RAILS UP, CALL LIGHT WITHIN REACH. HAS 
BILATERAL MITTENS INPLACE. WILL CONTINUE TO MONITOR.

## 2017-11-11 VITALS — SYSTOLIC BLOOD PRESSURE: 141 MMHG | DIASTOLIC BLOOD PRESSURE: 52 MMHG

## 2017-11-11 VITALS — DIASTOLIC BLOOD PRESSURE: 57 MMHG | SYSTOLIC BLOOD PRESSURE: 162 MMHG

## 2017-11-11 VITALS — SYSTOLIC BLOOD PRESSURE: 162 MMHG | DIASTOLIC BLOOD PRESSURE: 57 MMHG

## 2017-11-11 LAB
BUN SERPL-MCNC: 13 MG/DL (ref 7–18)
CALCIUM SERPL-MCNC: 8.6 MG/DL (ref 8.5–10.1)
CHLORIDE SERPL-SCNC: 103 MMOL/L (ref 98–107)
CO2 SERPL-SCNC: 34 MMOL/L (ref 21–32)
CREAT SERPL-MCNC: 0.6 MG/DL (ref 0.6–1.3)
GLUCOSE SERPL-MCNC: 62 MG/DL (ref 74–106)
POTASSIUM SERPL-SCNC: 3.3 MMOL/L (ref 3.5–5.1)
SODIUM SERPL-SCNC: 145 MMOL/L (ref 136–145)

## 2017-11-11 RX ADMIN — Medication SCH TAB: at 08:39

## 2017-11-11 RX ADMIN — LISINOPRIL SCH MG: 10 TABLET ORAL at 08:39

## 2017-11-11 RX ADMIN — CARVEDILOL SCH MG: 3.12 TABLET, FILM COATED ORAL at 08:40

## 2017-11-11 RX ADMIN — SODIUM CHLORIDE, SODIUM LACTATE, POTASSIUM CHLORIDE, AND CALCIUM CHLORIDE SCH MLS/HR: .6; .31; .03; .02 INJECTION, SOLUTION INTRAVENOUS at 15:35

## 2017-11-11 RX ADMIN — Medication SCH DROP: at 08:40

## 2017-11-11 RX ADMIN — LEVETIRACETAM SCH MG: 250 TABLET, FILM COATED ORAL at 08:39

## 2017-11-11 RX ADMIN — POVIDONE-IODINE SCH GM: 100 OINTMENT TOPICAL at 08:40

## 2017-11-11 RX ADMIN — SODIUM CHLORIDE, SODIUM LACTATE, POTASSIUM CHLORIDE, AND CALCIUM CHLORIDE SCH MLS/HR: .6; .31; .03; .02 INJECTION, SOLUTION INTRAVENOUS at 14:10

## 2017-11-11 RX ADMIN — LACTOBACILLUS TAB SCH EACH: TAB at 08:39

## 2017-11-11 RX ADMIN — LORAZEPAM PRN MG: 2 INJECTION INTRAMUSCULAR; INTRAVENOUS at 16:15

## 2017-11-11 RX ADMIN — SODIUM CHLORIDE SCH MLS/HR: 9 INJECTION, SOLUTION INTRAVENOUS at 08:40

## 2017-11-11 RX ADMIN — Medication SCH DROP: at 12:20

## 2017-11-11 RX ADMIN — INSULIN DETEMIR SCH UNIT: 100 INJECTION, SOLUTION SUBCUTANEOUS at 07:30

## 2017-11-11 RX ADMIN — SODIUM CHLORIDE SCH MLS/HR: 9 INJECTION, SOLUTION INTRAVENOUS at 01:00

## 2017-11-11 RX ADMIN — LACTOBACILLUS TAB SCH EACH: TAB at 12:20

## 2017-11-11 RX ADMIN — Medication SCH OZ: at 08:40

## 2017-11-11 NOTE — NUR
RN CLOSING NOTE

PATIENT DISCHARGED. LEFT VIA AMBULANCE. REPORT CALLED TO DEMETRIUS AT [facility].

## 2017-11-11 NOTE — NUR
RN/MS NOTES:



PT. IN BED RESTING. ALERT TO SELF. NO S/S OF FACIAL GRIMACE OR MOANING NOTED. CALL LIGHT W/ 
REACH. REPOSITIONED PER PROTOCOL. REPORT GIVEN TO NEXT SHIFT NURSE FOR HESHAM.

## 2017-11-11 NOTE — NUR
RN INITIAL NOTE

PATIENT RECEIVED IN BED, RESTING.  AWAKE, ALERT, DISORIENTED.  PRIMARILY Persian SPEAKING. NO 
S/S OF PAIN OR DISCOMFORT.  RESPIRATIONS ARE EVEN AND UNLABORED.  SATING WELL ON ROOM AIR.  
NO S/S OF RESPIRATORY DISTRESS OR SOB.  IV SITE FLUSHED, PATENT.  SKIN IS WARM AND DRY TO 
TOUCH.  SAFETY PRECAUTIONS IMPLEMENTED.  BED IN LOCKED, LOW POSITION WITH TWO SIDE RAILS UP. 
 CALL LIGHT AND BELONGINGS WITHIN EASY REACH.  WILL CONTINUE TO MONITOR.

## 2018-02-01 ENCOUNTER — HOSPITAL ENCOUNTER (INPATIENT)
Dept: HOSPITAL 54 - WOUND2 | Age: 83
LOS: 6 days | Discharge: NURSING, REHAB OR LONG-TERM CARE | DRG: 299 | End: 2018-02-07
Attending: INTERNAL MEDICINE | Admitting: INTERNAL MEDICINE
Payer: MEDICARE

## 2018-02-01 ENCOUNTER — HOSPITAL ENCOUNTER (OUTPATIENT)
Dept: HOSPITAL 54 - WOU | Age: 83
Discharge: OTHER INSTITUTION | End: 2018-02-01
Attending: PODIATRIST
Payer: MEDICARE

## 2018-02-01 VITALS — SYSTOLIC BLOOD PRESSURE: 143 MMHG | DIASTOLIC BLOOD PRESSURE: 86 MMHG

## 2018-02-01 VITALS — BODY MASS INDEX: 16.93 KG/M2 | WEIGHT: 84 LBS | HEIGHT: 59 IN

## 2018-02-01 DIAGNOSIS — Z66: ICD-10-CM

## 2018-02-01 DIAGNOSIS — R53.2: ICD-10-CM

## 2018-02-01 DIAGNOSIS — Z79.4: ICD-10-CM
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
## 2018-02-01 DIAGNOSIS — F03.90: ICD-10-CM

## 2018-02-01 DIAGNOSIS — S81.802A: ICD-10-CM

## 2018-02-01 DIAGNOSIS — R56.9: ICD-10-CM

## 2018-02-01 DIAGNOSIS — Z79.899: ICD-10-CM

## 2018-02-01 DIAGNOSIS — Z86.73: ICD-10-CM

## 2018-02-01 DIAGNOSIS — M81.0: ICD-10-CM

## 2018-02-01 DIAGNOSIS — I25.10: ICD-10-CM

## 2018-02-01 DIAGNOSIS — Z88.8: ICD-10-CM

## 2018-02-01 DIAGNOSIS — M24.562: ICD-10-CM

## 2018-02-01 DIAGNOSIS — F32.9: ICD-10-CM
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
## 2018-02-01 DIAGNOSIS — I96: ICD-10-CM

## 2018-02-01 DIAGNOSIS — L97.225: ICD-10-CM

## 2018-02-01 DIAGNOSIS — E11.622: ICD-10-CM

## 2018-02-01 DIAGNOSIS — M24.569: ICD-10-CM

## 2018-02-01 DIAGNOSIS — E44.0: ICD-10-CM

## 2018-02-01 DIAGNOSIS — E11.52: Primary | ICD-10-CM

## 2018-02-01 DIAGNOSIS — L97.929: ICD-10-CM

## 2018-02-01 DIAGNOSIS — H26.9: ICD-10-CM

## 2018-02-01 DIAGNOSIS — G40.909: ICD-10-CM

## 2018-02-01 DIAGNOSIS — D64.9: ICD-10-CM

## 2018-02-01 DIAGNOSIS — I10: ICD-10-CM

## 2018-02-01 PROCEDURE — A6402 STERILE GAUZE <= 16 SQ IN: HCPCS

## 2018-02-01 PROCEDURE — Z7610: HCPCS

## 2018-02-01 PROCEDURE — A6253 ABSORPT DRG > 48 SQ IN W/O B: HCPCS

## 2018-02-01 PROCEDURE — A4216 STERILE WATER/SALINE, 10 ML: HCPCS

## 2018-02-01 PROCEDURE — A6403 STERILE GAUZE>16 <= 48 SQ IN: HCPCS

## 2018-02-01 RX ADMIN — PIPERACILLIN SODIUM AND TAZOBACTAM SODIUM SCH MLS/HR: .25; 2 INJECTION, POWDER, LYOPHILIZED, FOR SOLUTION INTRAVENOUS at 21:10

## 2018-02-01 NOTE — NUR
M/S RN - Notes

Received from APC awake, alert to self, Spanish speaking, not in any form of distress, no s/s 
of pain, admitted for LLE ulcer/gangrene under the care of Dr. Germán Bellamy. Skin 
assessment done and documented. Wound and dietary triggered for consult. Patient came in 
with no belongings. All admission orders noted and carried out. Patient endorsed to night RN 
for continuity of care.

## 2018-02-01 NOTE — NUR
MS RN NOTES

RECEIVED ON BED,A/O X1,CONFUSED,CONTRACTED FROM [facility],LLE GANGRENE WITH DRESSING 
INTACT AND OLD DISCHARGES NOTED.SALINE LOCK PLACE ON RIGHT HAND #22.REPOSITION PER 
PROTOCOL.WILL CONTINUE TO MONITOR STATUS.

## 2018-02-02 VITALS — DIASTOLIC BLOOD PRESSURE: 71 MMHG | SYSTOLIC BLOOD PRESSURE: 135 MMHG

## 2018-02-02 VITALS — DIASTOLIC BLOOD PRESSURE: 56 MMHG | SYSTOLIC BLOOD PRESSURE: 108 MMHG

## 2018-02-02 VITALS — SYSTOLIC BLOOD PRESSURE: 141 MMHG | DIASTOLIC BLOOD PRESSURE: 60 MMHG

## 2018-02-02 LAB
BASOPHILS # BLD AUTO: 0 /CMM (ref 0–0.2)
BASOPHILS NFR BLD AUTO: 0.5 % (ref 0–2)
BUN SERPL-MCNC: 9 MG/DL (ref 7–18)
CALCIUM SERPL-MCNC: 9 MG/DL (ref 8.5–10.1)
CHLORIDE SERPL-SCNC: 101 MMOL/L (ref 98–107)
CO2 SERPL-SCNC: 31 MMOL/L (ref 21–32)
CREAT SERPL-MCNC: 0.6 MG/DL (ref 0.6–1.3)
EOSINOPHIL # BLD AUTO: 0.2 /CMM (ref 0–0.7)
EOSINOPHIL NFR BLD AUTO: 4.9 % (ref 0–6)
GLUCOSE SERPL-MCNC: 151 MG/DL (ref 74–106)
HCT VFR BLD AUTO: 33 % (ref 33–45)
HGB BLD-MCNC: 11.3 G/DL (ref 11.5–14.8)
LYMPHOCYTES NFR BLD AUTO: 1.3 /CMM (ref 0.8–4.8)
LYMPHOCYTES NFR BLD AUTO: 27.6 % (ref 20–44)
MCH RBC QN AUTO: 31 PG (ref 26–33)
MCHC RBC AUTO-ENTMCNC: 34 G/DL (ref 31–36)
MCV RBC AUTO: 91 FL (ref 82–100)
MONOCYTES NFR BLD AUTO: 0.4 /CMM (ref 0.1–1.3)
MONOCYTES NFR BLD AUTO: 8.2 % (ref 2–12)
NEUTROPHILS # BLD AUTO: 2.7 /CMM (ref 1.8–8.9)
NEUTROPHILS NFR BLD AUTO: 58.8 % (ref 43–81)
PLATELET # BLD AUTO: 363 /CMM (ref 150–450)
POTASSIUM SERPL-SCNC: 4.1 MMOL/L (ref 3.5–5.1)
RBC # BLD AUTO: 3.68 MIL/UL (ref 4–5.2)
RDW COEFFICIENT OF VARIATION: 13.2 (ref 11.5–15)
SODIUM SERPL-SCNC: 141 MMOL/L (ref 136–145)
WBC NRBC COR # BLD AUTO: 4.6 K/UL (ref 4.3–11)

## 2018-02-02 RX ADMIN — LACTOBACILLUS TAB SCH EACH: TAB at 09:54

## 2018-02-02 RX ADMIN — INSULIN GLARGINE SCH UNIT: 100 INJECTION, SOLUTION SUBCUTANEOUS at 22:00

## 2018-02-02 RX ADMIN — LEVETIRACETAM SCH MG: 250 TABLET, FILM COATED ORAL at 17:20

## 2018-02-02 RX ADMIN — PIPERACILLIN SODIUM AND TAZOBACTAM SODIUM SCH MLS/HR: .25; 2 INJECTION, POWDER, LYOPHILIZED, FOR SOLUTION INTRAVENOUS at 12:12

## 2018-02-02 RX ADMIN — CARVEDILOL SCH MG: 3.12 TABLET, FILM COATED ORAL at 09:55

## 2018-02-02 RX ADMIN — INSULIN ASPART PRN UNIT: 100 INJECTION, SOLUTION INTRAVENOUS; SUBCUTANEOUS at 17:26

## 2018-02-02 RX ADMIN — OXYCODONE HYDROCHLORIDE AND ACETAMINOPHEN SCH MG: 500 TABLET ORAL at 09:54

## 2018-02-02 RX ADMIN — CARVEDILOL SCH MG: 3.12 TABLET, FILM COATED ORAL at 17:20

## 2018-02-02 RX ADMIN — Medication SCH EACH: at 12:10

## 2018-02-02 RX ADMIN — LACTOBACILLUS TAB SCH EACH: TAB at 17:20

## 2018-02-02 RX ADMIN — Medication SCH EACH: at 22:29

## 2018-02-02 RX ADMIN — LISINOPRIL SCH MG: 10 TABLET ORAL at 09:00

## 2018-02-02 RX ADMIN — PIPERACILLIN SODIUM AND TAZOBACTAM SODIUM SCH MLS/HR: .25; 2 INJECTION, POWDER, LYOPHILIZED, FOR SOLUTION INTRAVENOUS at 05:02

## 2018-02-02 RX ADMIN — LACTOBACILLUS TAB SCH EACH: TAB at 12:12

## 2018-02-02 RX ADMIN — Medication SCH OZ: at 09:57

## 2018-02-02 RX ADMIN — Medication SCH EACH: at 17:22

## 2018-02-02 RX ADMIN — DEXTROSE MONOHYDRATE SCH MLS/HR: 50 INJECTION, SOLUTION INTRAVENOUS at 22:13

## 2018-02-02 RX ADMIN — Medication SCH MG: at 22:26

## 2018-02-02 RX ADMIN — LEVETIRACETAM SCH MG: 250 TABLET, FILM COATED ORAL at 09:54

## 2018-02-02 RX ADMIN — ACETAMINOPHEN SCH MG: 325 TABLET ORAL at 09:54

## 2018-02-02 RX ADMIN — SIMVASTATIN SCH MG: 10 TABLET, FILM COATED ORAL at 22:26

## 2018-02-02 RX ADMIN — SODIUM HYPOCHLORITE SCH ML: 2.5 SOLUTION TOPICAL at 09:56

## 2018-02-02 RX ADMIN — Medication SCH TAB: at 09:54

## 2018-02-02 RX ADMIN — Medication SCH MG: at 22:00

## 2018-02-02 RX ADMIN — PIPERACILLIN SODIUM AND TAZOBACTAM SODIUM SCH MLS/HR: .25; 2 INJECTION, POWDER, LYOPHILIZED, FOR SOLUTION INTRAVENOUS at 17:24

## 2018-02-02 RX ADMIN — INSULIN ASPART PRN UNIT: 100 INJECTION, SOLUTION INTRAVENOUS; SUBCUTANEOUS at 12:39

## 2018-02-02 RX ADMIN — SIMVASTATIN SCH MG: 10 TABLET, FILM COATED ORAL at 22:00

## 2018-02-02 NOTE — NUR
M/S RN - Notes

Patient alert to self, no s/s of pain, afebrile, no apparent distress noted. Seen by Dr. Peacock (DPM) for LLE gangrene/ulcer. Photo taken on LLE ulcer for reference. Wound 
treatment done as ordered. Patient turned and repositioned q2h and PRN. All needs attended 
and met. Fall and aspiration precautions maintained. Will continue to monitor closely.

## 2018-02-02 NOTE — NUR
MS2/RN

LANTUS 12 UNITS NOT GIVEN, BLOOD SUGAR  AND PATIENT DOES NOT WANT TO EAT SNACK. WILL 
MONITOR.

## 2018-02-02 NOTE — NUR
MS RN NOTES

IN BED CALM,BUT SCREAM AT TIMES WITH REPOSITIONING AND WOUND CARE.SALINE LOCK REMAINS INTACT 
AND PATENT.DRESSING CHANGED DONE LEFT LEG.AFEBRILE.WILL ENDORSE TO DAY NURSE FOR HESHAM.

## 2018-02-03 VITALS — DIASTOLIC BLOOD PRESSURE: 78 MMHG | SYSTOLIC BLOOD PRESSURE: 158 MMHG

## 2018-02-03 VITALS — SYSTOLIC BLOOD PRESSURE: 141 MMHG | DIASTOLIC BLOOD PRESSURE: 58 MMHG

## 2018-02-03 VITALS — DIASTOLIC BLOOD PRESSURE: 57 MMHG | SYSTOLIC BLOOD PRESSURE: 145 MMHG

## 2018-02-03 VITALS — SYSTOLIC BLOOD PRESSURE: 158 MMHG | DIASTOLIC BLOOD PRESSURE: 78 MMHG

## 2018-02-03 LAB
BUN SERPL-MCNC: 12 MG/DL (ref 7–18)
CALCIUM SERPL-MCNC: 8.8 MG/DL (ref 8.5–10.1)
CHLORIDE SERPL-SCNC: 104 MMOL/L (ref 98–107)
CO2 SERPL-SCNC: 29 MMOL/L (ref 21–32)
CREAT SERPL-MCNC: 0.9 MG/DL (ref 0.6–1.3)
GLUCOSE SERPL-MCNC: 143 MG/DL (ref 74–106)
POTASSIUM SERPL-SCNC: 4 MMOL/L (ref 3.5–5.1)
SODIUM SERPL-SCNC: 143 MMOL/L (ref 136–145)

## 2018-02-03 RX ADMIN — CARVEDILOL SCH MG: 3.12 TABLET, FILM COATED ORAL at 16:34

## 2018-02-03 RX ADMIN — Medication SCH EACH: at 17:23

## 2018-02-03 RX ADMIN — INSULIN GLARGINE SCH UNIT: 100 INJECTION, SOLUTION SUBCUTANEOUS at 21:12

## 2018-02-03 RX ADMIN — LEVETIRACETAM SCH MG: 250 TABLET, FILM COATED ORAL at 08:22

## 2018-02-03 RX ADMIN — ACETAMINOPHEN SCH MG: 325 TABLET ORAL at 08:22

## 2018-02-03 RX ADMIN — SODIUM HYPOCHLORITE SCH ML: 2.5 SOLUTION TOPICAL at 08:23

## 2018-02-03 RX ADMIN — Medication SCH OZ: at 11:28

## 2018-02-03 RX ADMIN — LEVETIRACETAM SCH MG: 250 TABLET, FILM COATED ORAL at 16:33

## 2018-02-03 RX ADMIN — CARVEDILOL SCH MG: 3.12 TABLET, FILM COATED ORAL at 08:22

## 2018-02-03 RX ADMIN — INSULIN GLARGINE SCH UNIT: 100 INJECTION, SOLUTION SUBCUTANEOUS at 07:30

## 2018-02-03 RX ADMIN — PIPERACILLIN SODIUM AND TAZOBACTAM SODIUM SCH MLS/HR: .25; 2 INJECTION, POWDER, LYOPHILIZED, FOR SOLUTION INTRAVENOUS at 00:18

## 2018-02-03 RX ADMIN — PIPERACILLIN SODIUM AND TAZOBACTAM SODIUM SCH MLS/HR: .25; 2 INJECTION, POWDER, LYOPHILIZED, FOR SOLUTION INTRAVENOUS at 05:39

## 2018-02-03 RX ADMIN — PIPERACILLIN SODIUM AND TAZOBACTAM SODIUM SCH MLS/HR: .25; 2 INJECTION, POWDER, LYOPHILIZED, FOR SOLUTION INTRAVENOUS at 11:24

## 2018-02-03 RX ADMIN — Medication SCH EACH: at 08:21

## 2018-02-03 RX ADMIN — Medication SCH TAB: at 08:22

## 2018-02-03 RX ADMIN — DEXTROSE MONOHYDRATE SCH MLS/HR: 50 INJECTION, SOLUTION INTRAVENOUS at 21:45

## 2018-02-03 RX ADMIN — OXYCODONE HYDROCHLORIDE AND ACETAMINOPHEN SCH MG: 500 TABLET ORAL at 08:22

## 2018-02-03 RX ADMIN — SIMVASTATIN SCH MG: 10 TABLET, FILM COATED ORAL at 21:37

## 2018-02-03 RX ADMIN — LACTOBACILLUS TAB SCH EACH: TAB at 08:21

## 2018-02-03 RX ADMIN — Medication SCH EACH: at 21:10

## 2018-02-03 RX ADMIN — LISINOPRIL SCH MG: 10 TABLET ORAL at 08:22

## 2018-02-03 RX ADMIN — LACTOBACILLUS TAB SCH EACH: TAB at 12:50

## 2018-02-03 RX ADMIN — PIPERACILLIN SODIUM AND TAZOBACTAM SODIUM SCH MLS/HR: .25; 2 INJECTION, POWDER, LYOPHILIZED, FOR SOLUTION INTRAVENOUS at 23:19

## 2018-02-03 RX ADMIN — Medication SCH MG: at 21:37

## 2018-02-03 RX ADMIN — Medication SCH EACH: at 11:23

## 2018-02-03 RX ADMIN — PIPERACILLIN SODIUM AND TAZOBACTAM SODIUM SCH MLS/HR: .25; 2 INJECTION, POWDER, LYOPHILIZED, FOR SOLUTION INTRAVENOUS at 17:22

## 2018-02-03 RX ADMIN — LACTOBACILLUS TAB SCH EACH: TAB at 16:33

## 2018-02-03 NOTE — NUR
RN MS NOTES

PT IN BED, AWAKE, NO FACIAL GRIMACING OR MOANING, NOT IN DISTRESS, KEPT CLEAN AND DRY, 
TURNED AND REPOSITIONED, BLOOD SUGAR CHECKED, NO S/S OF HYPO/HYPERGLYCEMIA NOTED.

## 2018-02-03 NOTE — NUR
MS2/RN

ACCU CHECK DONE, BLOOD SUGAR 147, DID NOT COVER AS THE PATIENT MIGHT NOT EAT BREAKFAST PER 
DAY SHIFT RN, IMAN. PER IMAN SHE WILL MONITOR AND RECHECK BLOOD SUGAR.

## 2018-02-03 NOTE — NUR
RN MS NOTES

PT IN BED, AWAKE, ALERT TO SELF, NO SIGN OF PAIN OR DISTRESS, WOUND TREATMENT DONE, DRESSING 
CHANGE DONE AS NEEDED, FAMILY VISITED, ASSISTED WITH MEALS, TURNED AND REPOSITIONED Q2 
HOURS, MEDS GIVEN AS ORDERED, KEPT CLEAN AND DRY.

## 2018-02-03 NOTE — NUR
MS2/RN

PATIENT IS AWAKE, COMFORTABLE, NO DISTRESS NOTED. ALL NEEDS ATTENDED AT THIS TIME. WILL 
CONTINUE TO MONITOR.

## 2018-02-03 NOTE — NUR
MS RN NOTES  ACCUCHECK

BLOOD SUGAR CHECK 123,NO INSULIN COVERAGE.LANTUS 10 UNITS HELD,PATIENT DOESNT EAT MUCH OF 
HER DINNER FOOD,NO IV FLUIDS.

## 2018-02-03 NOTE — NUR
MS/2/RN

PATIENT IS SLEEPING AT THIS TIME, EASILY AROUSABLE, APPEAR COMFORTABLE, NO DISTRESS NOTED, 
CALL LIGHT IN REACH. WILL CONTINUE TO MONITOR.

## 2018-02-03 NOTE — NUR
RN  MS NOTES

PT IN BED, ASLEEP, RESPIRATIONS REGULAR AND NOT LABORED, EASY TO AROUSE, NO FACIAL GRIMACING 
OR MOANING, REPOSITIONED FOR COMFORT, KEPT WARM AND COMFORTABLE IN BED.

## 2018-02-03 NOTE — NUR
MS RN NOTES

ON BED  A/O X2.ABLE TO ANSWER SIMPLE QUESTION. RIGHT LOWER LEG DRESSING INTACT AND DRY.NS AT 
TKO RATE INFUSING ON RIGHT HAND VIA IV PUMP.DNR STATUS.CALL LIGHT IN REACH,WILL CONTINUE TO 
MONITOR STATUS.

## 2018-02-04 VITALS — DIASTOLIC BLOOD PRESSURE: 74 MMHG | SYSTOLIC BLOOD PRESSURE: 146 MMHG

## 2018-02-04 VITALS — SYSTOLIC BLOOD PRESSURE: 146 MMHG | DIASTOLIC BLOOD PRESSURE: 71 MMHG

## 2018-02-04 VITALS — SYSTOLIC BLOOD PRESSURE: 159 MMHG | DIASTOLIC BLOOD PRESSURE: 85 MMHG

## 2018-02-04 VITALS — DIASTOLIC BLOOD PRESSURE: 73 MMHG | SYSTOLIC BLOOD PRESSURE: 161 MMHG

## 2018-02-04 LAB
BUN SERPL-MCNC: 12 MG/DL (ref 7–18)
CALCIUM SERPL-MCNC: 8.9 MG/DL (ref 8.5–10.1)
CHLORIDE SERPL-SCNC: 106 MMOL/L (ref 98–107)
CO2 SERPL-SCNC: 30 MMOL/L (ref 21–32)
CREAT SERPL-MCNC: 0.8 MG/DL (ref 0.6–1.3)
GLUCOSE SERPL-MCNC: 142 MG/DL (ref 74–106)
POTASSIUM SERPL-SCNC: 3.4 MMOL/L (ref 3.5–5.1)
SODIUM SERPL-SCNC: 147 MMOL/L (ref 136–145)

## 2018-02-04 RX ADMIN — CARVEDILOL SCH MG: 3.12 TABLET, FILM COATED ORAL at 08:40

## 2018-02-04 RX ADMIN — Medication SCH EACH: at 21:54

## 2018-02-04 RX ADMIN — PIPERACILLIN SODIUM AND TAZOBACTAM SODIUM SCH MLS/HR: .25; 2 INJECTION, POWDER, LYOPHILIZED, FOR SOLUTION INTRAVENOUS at 17:08

## 2018-02-04 RX ADMIN — DEXTROSE MONOHYDRATE SCH MLS/HR: 50 INJECTION, SOLUTION INTRAVENOUS at 21:54

## 2018-02-04 RX ADMIN — LACTOBACILLUS TAB SCH EACH: TAB at 17:06

## 2018-02-04 RX ADMIN — OXYCODONE HYDROCHLORIDE AND ACETAMINOPHEN SCH MG: 500 TABLET ORAL at 08:39

## 2018-02-04 RX ADMIN — Medication SCH EACH: at 17:07

## 2018-02-04 RX ADMIN — PIPERACILLIN SODIUM AND TAZOBACTAM SODIUM SCH MLS/HR: .25; 2 INJECTION, POWDER, LYOPHILIZED, FOR SOLUTION INTRAVENOUS at 05:24

## 2018-02-04 RX ADMIN — INSULIN GLARGINE SCH UNIT: 100 INJECTION, SOLUTION SUBCUTANEOUS at 22:00

## 2018-02-04 RX ADMIN — INSULIN ASPART PRN UNIT: 100 INJECTION, SOLUTION INTRAVENOUS; SUBCUTANEOUS at 17:16

## 2018-02-04 RX ADMIN — SIMVASTATIN SCH MG: 10 TABLET, FILM COATED ORAL at 21:56

## 2018-02-04 RX ADMIN — INSULIN ASPART PRN UNIT: 100 INJECTION, SOLUTION INTRAVENOUS; SUBCUTANEOUS at 11:39

## 2018-02-04 RX ADMIN — CARVEDILOL SCH MG: 3.12 TABLET, FILM COATED ORAL at 17:07

## 2018-02-04 RX ADMIN — Medication SCH MG: at 21:56

## 2018-02-04 RX ADMIN — Medication SCH EACH: at 05:25

## 2018-02-04 RX ADMIN — Medication SCH EACH: at 11:30

## 2018-02-04 RX ADMIN — LISINOPRIL SCH MG: 10 TABLET ORAL at 08:40

## 2018-02-04 RX ADMIN — ACETAMINOPHEN SCH MG: 325 TABLET ORAL at 08:39

## 2018-02-04 RX ADMIN — PIPERACILLIN SODIUM AND TAZOBACTAM SODIUM SCH MLS/HR: .25; 2 INJECTION, POWDER, LYOPHILIZED, FOR SOLUTION INTRAVENOUS at 11:30

## 2018-02-04 RX ADMIN — PIPERACILLIN SODIUM AND TAZOBACTAM SODIUM SCH MLS/HR: .25; 2 INJECTION, POWDER, LYOPHILIZED, FOR SOLUTION INTRAVENOUS at 23:45

## 2018-02-04 RX ADMIN — LEVETIRACETAM SCH MG: 250 TABLET, FILM COATED ORAL at 17:06

## 2018-02-04 RX ADMIN — INSULIN ASPART PRN UNIT: 100 INJECTION, SOLUTION INTRAVENOUS; SUBCUTANEOUS at 05:27

## 2018-02-04 RX ADMIN — Medication SCH TAB: at 08:39

## 2018-02-04 RX ADMIN — LEVETIRACETAM SCH MG: 250 TABLET, FILM COATED ORAL at 08:39

## 2018-02-04 RX ADMIN — Medication SCH OZ: at 08:39

## 2018-02-04 RX ADMIN — INSULIN GLARGINE SCH UNIT: 100 INJECTION, SOLUTION SUBCUTANEOUS at 07:30

## 2018-02-04 RX ADMIN — SODIUM HYPOCHLORITE SCH ML: 2.5 SOLUTION TOPICAL at 08:41

## 2018-02-04 RX ADMIN — LACTOBACILLUS TAB SCH EACH: TAB at 08:39

## 2018-02-04 RX ADMIN — LACTOBACILLUS TAB SCH EACH: TAB at 13:03

## 2018-02-04 NOTE — NUR
[facility] RN NOT[facility]  [facility] [place] 136,COVERED WITH INSULIN ASPHALT 2 UNITS SQ VIA LEFT DELTOID

## 2018-02-04 NOTE — NUR
MS RN NOTES

RECEIVED RESTING CALM AND QUIET ON BED,NS AT TKO RATE INFUSING VIA RIGHT HAND,LLE DRESSING 
INTACT AND DRY ELEVATED ON PILLOW.WILL CONTINUE TO MONITOR STATUS.

## 2018-02-04 NOTE — NUR
RN MS NOTES

PT IN BED, AWAKE, ALERT TO SELF, NO FACIAL GRIMACING OR MOANING, BREATHING PATTERN NORMAL, 
ASSISTED WITH MEALS, PM MEDS GIVEN AS ORDERED, TREATMENT DONE AND DRESSING CHANGE DONE, 
TURNED AND REPOSITIONED Q2 HOURS, KEPT SKIN CLEAN, KEPT COMFORTABLE, ALL NEEDS ATTENDED.

## 2018-02-04 NOTE — NUR
MS RN NOTES

SLEPT WITH INTERVALS,REPOSITION Q 2 HOURS,IV ABX TOLERATED WELL.IN NO ACUTE DISTRESS.WILL 
ENDORSE TO DAY NURSE FOR HESHAM.

## 2018-02-04 NOTE — NUR
RN MS NOTES

PT IN BED, AWAKE, ALERT TO SELF, NO SIGN OF PAIN, NO FACIAL GRIMACING, BREATHING PATTERN 
NORMAL, KEPT WARM AND COMFORTABLE, REPOSITIONED FOR COMFORT.

## 2018-02-04 NOTE — NUR
RN MS NOTES

PT IN BED, AWAKE, NO FACIAL GRIMACING OR MOANING, RESPIRATIONS NORMAL, TURNED AND 
REPOSITIONED Q2 HOURS, KEPT SKIN CLEAN AND DRY, MEDICATIONS GIVEN AS ORDERED, KEPT 
COMFORTABLE.

## 2018-02-05 VITALS — SYSTOLIC BLOOD PRESSURE: 141 MMHG | DIASTOLIC BLOOD PRESSURE: 73 MMHG

## 2018-02-05 VITALS — DIASTOLIC BLOOD PRESSURE: 75 MMHG | SYSTOLIC BLOOD PRESSURE: 151 MMHG

## 2018-02-05 VITALS — DIASTOLIC BLOOD PRESSURE: 79 MMHG | SYSTOLIC BLOOD PRESSURE: 131 MMHG

## 2018-02-05 LAB
BUN SERPL-MCNC: 9 MG/DL (ref 7–18)
CALCIUM SERPL-MCNC: 8.7 MG/DL (ref 8.5–10.1)
CHLORIDE SERPL-SCNC: 105 MMOL/L (ref 98–107)
CO2 SERPL-SCNC: 25 MMOL/L (ref 21–32)
CREAT SERPL-MCNC: 0.7 MG/DL (ref 0.6–1.3)
GLUCOSE SERPL-MCNC: 119 MG/DL (ref 74–106)
POTASSIUM SERPL-SCNC: 4.1 MMOL/L (ref 3.5–5.1)
SODIUM SERPL-SCNC: 142 MMOL/L (ref 136–145)

## 2018-02-05 RX ADMIN — LISINOPRIL SCH MG: 10 TABLET ORAL at 10:14

## 2018-02-05 RX ADMIN — LEVETIRACETAM SCH MG: 250 TABLET, FILM COATED ORAL at 10:14

## 2018-02-05 RX ADMIN — LEVETIRACETAM SCH MG: 250 TABLET, FILM COATED ORAL at 16:34

## 2018-02-05 RX ADMIN — Medication SCH EACH: at 12:27

## 2018-02-05 RX ADMIN — INSULIN GLARGINE SCH UNIT: 100 INJECTION, SOLUTION SUBCUTANEOUS at 21:55

## 2018-02-05 RX ADMIN — DEXTROSE MONOHYDRATE SCH MLS/HR: 50 INJECTION, SOLUTION INTRAVENOUS at 21:53

## 2018-02-05 RX ADMIN — Medication SCH EACH: at 21:53

## 2018-02-05 RX ADMIN — Medication SCH TAB: at 10:13

## 2018-02-05 RX ADMIN — Medication SCH OZ: at 10:13

## 2018-02-05 RX ADMIN — SODIUM HYPOCHLORITE SCH ML: 2.5 SOLUTION TOPICAL at 10:13

## 2018-02-05 RX ADMIN — LACTOBACILLUS TAB SCH EACH: TAB at 12:59

## 2018-02-05 RX ADMIN — PIPERACILLIN SODIUM AND TAZOBACTAM SODIUM SCH MLS/HR: .25; 2 INJECTION, POWDER, LYOPHILIZED, FOR SOLUTION INTRAVENOUS at 05:22

## 2018-02-05 RX ADMIN — OXYCODONE HYDROCHLORIDE AND ACETAMINOPHEN SCH MG: 500 TABLET ORAL at 10:13

## 2018-02-05 RX ADMIN — CARVEDILOL SCH MG: 3.12 TABLET, FILM COATED ORAL at 10:14

## 2018-02-05 RX ADMIN — CARVEDILOL SCH MG: 3.12 TABLET, FILM COATED ORAL at 16:34

## 2018-02-05 RX ADMIN — PIPERACILLIN SODIUM AND TAZOBACTAM SODIUM SCH MLS/HR: .25; 2 INJECTION, POWDER, LYOPHILIZED, FOR SOLUTION INTRAVENOUS at 17:22

## 2018-02-05 RX ADMIN — SIMVASTATIN SCH MG: 10 TABLET, FILM COATED ORAL at 21:53

## 2018-02-05 RX ADMIN — Medication SCH EACH: at 05:22

## 2018-02-05 RX ADMIN — ACETAMINOPHEN SCH MG: 325 TABLET ORAL at 10:13

## 2018-02-05 RX ADMIN — INSULIN ASPART PRN UNIT: 100 INJECTION, SOLUTION INTRAVENOUS; SUBCUTANEOUS at 12:56

## 2018-02-05 RX ADMIN — INSULIN GLARGINE SCH UNIT: 100 INJECTION, SOLUTION SUBCUTANEOUS at 07:30

## 2018-02-05 RX ADMIN — LACTOBACILLUS TAB SCH EACH: TAB at 16:34

## 2018-02-05 RX ADMIN — Medication SCH MG: at 21:53

## 2018-02-05 RX ADMIN — PIPERACILLIN SODIUM AND TAZOBACTAM SODIUM SCH MLS/HR: .25; 2 INJECTION, POWDER, LYOPHILIZED, FOR SOLUTION INTRAVENOUS at 12:23

## 2018-02-05 RX ADMIN — Medication SCH EACH: at 17:22

## 2018-02-05 RX ADMIN — LACTOBACILLUS TAB SCH EACH: TAB at 10:14

## 2018-02-05 NOTE — NUR
RN NOTES

PATIENT ALERT AND ORIENTED X1, NO S/SX OF RESPIRATORY DISTRESS NOTED. NO S/SX OF PAIN OR 
DISCOMFORT,KEPT COMFORTABLE, TURNED AND REPOSITIONED, NEEDS ATTENDED AND ANTICIPATED. CALL 
LIGHT WITHIN REACH, WILL CONTINUE TO MONITOR.

## 2018-02-05 NOTE — NUR
MS RN NOTE:



PATIENT RESTING IN BED, NO ACUTE DISTRESS NOTED. BREATHING EVEN AND UNLABORED, NO SOB NOTED. 
IV TO RIGHT HAND IN PLACE. NO S/S OF HYPER/HYPOGLYCEMIA NOTED. BED LOCKED AND IN LOWEST 
POSITION, CALL LIGHT IN REACH. WILL CONTINUE TO MONITOR.

## 2018-02-05 NOTE — NUR
MS RN NOTE:



PATIENT BLOOD SUGAR LEVEL 126 MG/DL, NO INSULIN NEEDED PER SLIDING SCALE. PATIENT LANTUS 
ALSO HELD DUE TO POOR ORAL INTAKE. NO S/S OF HYPER/HYPOGLYCEMIA NOTED. WILL CONTINUE TO 
MONITOR.

## 2018-02-05 NOTE — NUR
RN NOTES 

INSULIN FOR DINNER HELD D/T PATIENTS POOR PO INTAKE. PT IS IN NO DISTRESS, NO SOB NOTED, 
TURNED AND REPOSITIONED EVERY 2 HOURS, WOUND TREATMENT RENDERED AS ORDERED, BLE OFFLOADED, 
ALL DUE MEDICATIONS GIVEN AS ORDERED. NEEDS ATTENDED AND ANTICIPATED, CALL LIGHT WITHIN 
REACH, WILL ENDORSE TO NIGHT SHIFT FOR HESHAM.

## 2018-02-05 NOTE — NUR
MS RN NOTES

FAIRLY RESTED,NO CHANGE IN STATUS.IV ABX TOLERATED WELL.WOUND CARE DONE TO LLE.REPOSITION 
PER PROTOCOL.IN NO ACUTE DISTRESS.WILL ENDORSE TO DAY NURSE FOR HESHAM.

## 2018-02-06 VITALS — DIASTOLIC BLOOD PRESSURE: 95 MMHG | SYSTOLIC BLOOD PRESSURE: 182 MMHG

## 2018-02-06 VITALS — DIASTOLIC BLOOD PRESSURE: 74 MMHG | SYSTOLIC BLOOD PRESSURE: 103 MMHG

## 2018-02-06 VITALS — DIASTOLIC BLOOD PRESSURE: 91 MMHG | SYSTOLIC BLOOD PRESSURE: 146 MMHG

## 2018-02-06 LAB
BASOPHILS # BLD AUTO: 0 /CMM (ref 0–0.2)
BASOPHILS NFR BLD AUTO: 0.5 % (ref 0–2)
BUN SERPL-MCNC: 9 MG/DL (ref 7–18)
CALCIUM SERPL-MCNC: 9 MG/DL (ref 8.5–10.1)
CHLORIDE SERPL-SCNC: 103 MMOL/L (ref 98–107)
CO2 SERPL-SCNC: 30 MMOL/L (ref 21–32)
CREAT SERPL-MCNC: 0.7 MG/DL (ref 0.6–1.3)
EOSINOPHIL # BLD AUTO: 0.2 /CMM (ref 0–0.7)
EOSINOPHIL NFR BLD AUTO: 3.9 % (ref 0–6)
GLUCOSE SERPL-MCNC: 123 MG/DL (ref 74–106)
HCT VFR BLD AUTO: 35 % (ref 33–45)
HGB BLD-MCNC: 11.5 G/DL (ref 11.5–14.8)
LYMPHOCYTES NFR BLD AUTO: 1.4 /CMM (ref 0.8–4.8)
LYMPHOCYTES NFR BLD AUTO: 24.7 % (ref 20–44)
MAGNESIUM SERPL-MCNC: 1.1 MG/DL (ref 1.8–2.4)
MCH RBC QN AUTO: 30 PG (ref 26–33)
MCHC RBC AUTO-ENTMCNC: 33 G/DL (ref 31–36)
MCV RBC AUTO: 91 FL (ref 82–100)
MONOCYTES NFR BLD AUTO: 0.4 /CMM (ref 0.1–1.3)
MONOCYTES NFR BLD AUTO: 6.1 % (ref 2–12)
NEUTROPHILS # BLD AUTO: 3.7 /CMM (ref 1.8–8.9)
NEUTROPHILS NFR BLD AUTO: 64.8 % (ref 43–81)
PHOSPHATE SERPL-MCNC: 2.7 MG/DL (ref 2.5–4.9)
PLATELET # BLD AUTO: 334 /CMM (ref 150–450)
POTASSIUM SERPL-SCNC: 3.4 MMOL/L (ref 3.5–5.1)
RBC # BLD AUTO: 3.8 MIL/UL (ref 4–5.2)
RDW COEFFICIENT OF VARIATION: 13 (ref 11.5–15)
SODIUM SERPL-SCNC: 144 MMOL/L (ref 136–145)
WBC NRBC COR # BLD AUTO: 5.7 K/UL (ref 4.3–11)

## 2018-02-06 RX ADMIN — CARVEDILOL SCH MG: 3.12 TABLET, FILM COATED ORAL at 08:20

## 2018-02-06 RX ADMIN — OXYCODONE HYDROCHLORIDE AND ACETAMINOPHEN SCH MG: 500 TABLET ORAL at 08:20

## 2018-02-06 RX ADMIN — DEXTROSE MONOHYDRATE SCH MLS/HR: 50 INJECTION, SOLUTION INTRAVENOUS at 22:23

## 2018-02-06 RX ADMIN — INSULIN GLARGINE SCH UNIT: 100 INJECTION, SOLUTION SUBCUTANEOUS at 07:30

## 2018-02-06 RX ADMIN — PIPERACILLIN SODIUM AND TAZOBACTAM SODIUM SCH MLS/HR: .25; 2 INJECTION, POWDER, LYOPHILIZED, FOR SOLUTION INTRAVENOUS at 00:37

## 2018-02-06 RX ADMIN — MAGNESIUM SULFATE IN DEXTROSE SCH MLS/HR: 10 INJECTION, SOLUTION INTRAVENOUS at 20:35

## 2018-02-06 RX ADMIN — Medication SCH OZ: at 08:22

## 2018-02-06 RX ADMIN — INSULIN GLARGINE SCH UNIT: 100 INJECTION, SOLUTION SUBCUTANEOUS at 22:41

## 2018-02-06 RX ADMIN — LEVETIRACETAM SCH MG: 250 TABLET, FILM COATED ORAL at 17:20

## 2018-02-06 RX ADMIN — SODIUM HYPOCHLORITE SCH ML: 2.5 SOLUTION TOPICAL at 08:22

## 2018-02-06 RX ADMIN — CARVEDILOL SCH MG: 3.12 TABLET, FILM COATED ORAL at 17:22

## 2018-02-06 RX ADMIN — LACTOBACILLUS TAB SCH EACH: TAB at 08:21

## 2018-02-06 RX ADMIN — Medication SCH EACH: at 22:25

## 2018-02-06 RX ADMIN — MAGNESIUM SULFATE IN DEXTROSE SCH MLS/HR: 10 INJECTION, SOLUTION INTRAVENOUS at 20:34

## 2018-02-06 RX ADMIN — MAGNESIUM SULFATE IN DEXTROSE SCH MLS/HR: 10 INJECTION, SOLUTION INTRAVENOUS at 17:22

## 2018-02-06 RX ADMIN — ACETAMINOPHEN SCH MG: 325 TABLET ORAL at 08:21

## 2018-02-06 RX ADMIN — MAGNESIUM SULFATE IN DEXTROSE SCH MLS/HR: 10 INJECTION, SOLUTION INTRAVENOUS at 18:03

## 2018-02-06 RX ADMIN — PIPERACILLIN SODIUM AND TAZOBACTAM SODIUM SCH MLS/HR: .25; 2 INJECTION, POWDER, LYOPHILIZED, FOR SOLUTION INTRAVENOUS at 17:19

## 2018-02-06 RX ADMIN — LEVETIRACETAM SCH MG: 250 TABLET, FILM COATED ORAL at 08:20

## 2018-02-06 RX ADMIN — Medication SCH EACH: at 11:47

## 2018-02-06 RX ADMIN — Medication SCH TAB: at 08:20

## 2018-02-06 RX ADMIN — INSULIN ASPART PRN UNIT: 100 INJECTION, SOLUTION INTRAVENOUS; SUBCUTANEOUS at 17:51

## 2018-02-06 RX ADMIN — PIPERACILLIN SODIUM AND TAZOBACTAM SODIUM SCH MLS/HR: .25; 2 INJECTION, POWDER, LYOPHILIZED, FOR SOLUTION INTRAVENOUS at 11:47

## 2018-02-06 RX ADMIN — LACTOBACILLUS TAB SCH EACH: TAB at 12:11

## 2018-02-06 RX ADMIN — LISINOPRIL SCH MG: 10 TABLET ORAL at 08:20

## 2018-02-06 RX ADMIN — SIMVASTATIN SCH MG: 10 TABLET, FILM COATED ORAL at 22:23

## 2018-02-06 RX ADMIN — Medication SCH EACH: at 06:37

## 2018-02-06 RX ADMIN — LACTOBACILLUS TAB SCH EACH: TAB at 17:20

## 2018-02-06 RX ADMIN — PIPERACILLIN SODIUM AND TAZOBACTAM SODIUM SCH MLS/HR: .25; 2 INJECTION, POWDER, LYOPHILIZED, FOR SOLUTION INTRAVENOUS at 05:20

## 2018-02-06 RX ADMIN — Medication SCH MG: at 22:23

## 2018-02-06 RX ADMIN — Medication SCH EACH: at 17:20

## 2018-02-06 NOTE — NUR
RN OPENING NOTES



RECEIVED REPORT FROM DAYSHIFT RNSILVIA. FOUND Pt AWAKE, RESTING IN BED, WATCHING TV. NO S/S 
OF ACUTE DISTRESS OR SOB NOTED. Pt IS A/OX1, [place] SPEAKING ONLY WITH DEMENTIA. IV ACCESS 
ON [facility] #[id number], SL. SAFETY MEASURES IN PLACE. BED LOW, LOCKED, HOB ELEVATED, SIDE RAILS UP, 
CALL LIGHT AND BEDSIDE TABLE WITHIN REACH. WILL CONTINUE TO MONITOR Pt THROUGHOUT THE NIGHT 
FOR SAFETY.

## 2018-02-06 NOTE — NUR
MS/RN   End note



No changes in care at this time. 

Magnesium currently being replaced, two further bags to be infused. Skin kept clean and dry. 
Has been turned and repositioned every 2-3 hours to prevent skin further breakdown, heels 
elevated on pillows. Bed in low setting, brakes locked, side reails X3 in upright position. 
Will endorse to night shift.

## 2018-02-06 NOTE — NUR
WOUND CARE CONSULT: PT PRESENTS WITH LEFT LOWER LEG DRESSING IN PLACE, SACRAL AND BUTTOCK 
SCARRING, SCARRING TO RT LOWER EXTREMITY AND SEVERE LOWER EXTREMITY CONTRACTURES, PRESENT ON 
ADMISSION. PT PLACED ON ALICIA ISOFLEX LOW AIRLOSS BED. ALL SKIN PROTECTION MEASURES IN 
PLACE AND DISCUSSED WITH NURSING STAFF. CURRENT ELBERT SCORE IS 10. DEFER TO DR DUEÑAS 
FOR LOWER EXTREMITIES. MD IN AGREEMENT WITH PLAN OF CARE. WILL SEE PRN. 

-------------------------------------------------------------------------------

Addendum: 02/06/18 at 0956 by BLAINE TINSLEY WNDNU

-------------------------------------------------------------------------------

Amended: Links added.

## 2018-02-06 NOTE — NUR
MS/RN   Patient received



Patient received form night shift. Sleeping soundly at this time. Bed alarm switched on. 
Side rails X3 in upright position, brakes locked. Call light within reach, will continue to 
monitor and ensure safety.

## 2018-02-07 VITALS — SYSTOLIC BLOOD PRESSURE: 116 MMHG | DIASTOLIC BLOOD PRESSURE: 61 MMHG

## 2018-02-07 LAB
BUN SERPL-MCNC: 13 MG/DL (ref 7–18)
CALCIUM SERPL-MCNC: 8.9 MG/DL (ref 8.5–10.1)
CHLORIDE SERPL-SCNC: 102 MMOL/L (ref 98–107)
CO2 SERPL-SCNC: 29 MMOL/L (ref 21–32)
CREAT SERPL-MCNC: 0.6 MG/DL (ref 0.6–1.3)
GLUCOSE SERPL-MCNC: 157 MG/DL (ref 74–106)
POTASSIUM SERPL-SCNC: 2.9 MMOL/L (ref 3.5–5.1)
SODIUM SERPL-SCNC: 143 MMOL/L (ref 136–145)

## 2018-02-07 RX ADMIN — Medication SCH EACH: at 11:59

## 2018-02-07 RX ADMIN — LEVETIRACETAM SCH MG: 250 TABLET, FILM COATED ORAL at 08:36

## 2018-02-07 RX ADMIN — ACETAMINOPHEN SCH MG: 325 TABLET ORAL at 08:36

## 2018-02-07 RX ADMIN — OXYCODONE HYDROCHLORIDE AND ACETAMINOPHEN SCH MG: 500 TABLET ORAL at 08:37

## 2018-02-07 RX ADMIN — LACTOBACILLUS TAB SCH EACH: TAB at 08:36

## 2018-02-07 RX ADMIN — SODIUM HYPOCHLORITE SCH ML: 2.5 SOLUTION TOPICAL at 08:44

## 2018-02-07 RX ADMIN — Medication SCH OZ: at 08:39

## 2018-02-07 RX ADMIN — INSULIN GLARGINE SCH UNIT: 100 INJECTION, SOLUTION SUBCUTANEOUS at 06:44

## 2018-02-07 RX ADMIN — LISINOPRIL SCH MG: 10 TABLET ORAL at 08:37

## 2018-02-07 RX ADMIN — CARVEDILOL SCH MG: 3.12 TABLET, FILM COATED ORAL at 08:37

## 2018-02-07 RX ADMIN — INSULIN ASPART PRN UNIT: 100 INJECTION, SOLUTION INTRAVENOUS; SUBCUTANEOUS at 12:40

## 2018-02-07 RX ADMIN — LACTOBACILLUS TAB SCH EACH: TAB at 13:33

## 2018-02-07 RX ADMIN — POTASSIUM CHLORIDE SCH MEQ: 1.5 POWDER, FOR SOLUTION ORAL at 11:59

## 2018-02-07 RX ADMIN — Medication SCH TAB: at 08:36

## 2018-02-07 RX ADMIN — Medication SCH EACH: at 06:39

## 2018-02-07 RX ADMIN — POTASSIUM CHLORIDE SCH MEQ: 1.5 POWDER, FOR SOLUTION ORAL at 13:29

## 2018-02-07 RX ADMIN — POTASSIUM CHLORIDE SCH MEQ: 1.5 POWDER, FOR SOLUTION ORAL at 14:57

## 2018-02-07 NOTE — NUR
RN NOTES

PATIENT A/OX1, BREATHING EVEN AND UNLABORED, NO DISTRESS, NO SOB NOTED, PICKED UP BY 
PAULETTE, ACCOMPANIED BY 2 EMTS. KEPT PIV ON RIGHT HAND 22G BECAUSE PATIENT IS BEING 
TRANSFERRED TO [facility] WITH CONTINUATION OF IV ANTIBIOTIC. REPORT AND DISCHARGE 
PAPERWORKS GIVEN TO PAULETTE. LEFT THE FACILITY IN STABLE CONDITION.

## 2018-02-07 NOTE — NUR
RN CLOSING NOTES



NO SIGNIFICANT CHANGES IN Pt's CONDITION. Pt REMAINS IN STABLE CONDITION AT THIS TIME. NO 
S/S OF ACUTE DISTRESS OR SOB NOTED DURING THE NIGHT. ALL NEEDS MET AND ATTENDED TO. SAFETY 
MEASURES IN PLACE. WILL ENDORSE TO DAYSHIFT RN FOR Pt's HESHAM.

## 2018-02-07 NOTE — NUR
RN NOTES

RECEIVED ORDER FOR DISCHARGE FROM DR. MILLIGAN. PATIENT CONFUSED, UNABLE TO UNDERSTAND 
DISCHARGE INSTRUCTIONS. REPORT GIVEN TO ISRAEL NUNES AT [facility], DISCHARGE 
INSTRUCTIONS RELAYED. PATIENT WILL CONTINUE LEVAQUIN AND VANCOMYCIN AT SNF. SKIN ASSESSMENT 
COMPLETED, NO CHANGES, PHOTO TAKEN OF LLE. SACRAL DRY AND INTACT. NEEDS ATTENDED AND MET, 
WILL ADMINISTER IV VANCO PRIOR TO DISCHARGE.

## 2018-02-07 NOTE — NUR
RN NOTES

PATIENT AWAKE, A/OX1, CONFUSED, BREATHING EVEN AND UNLABORED, NO SOB NOTED, LLE COVERED WITH 
CLEAN AND DRY DRESSING, PATIENT TURNED AND REPOSITIONED, KEPT COMFORTABLE, NEEDS ATTENDED 
AND MET, CALL LIGHT WITHIN REACH, WILL CONTINUE TO MONITOR.

## 2019-09-12 NOTE — NUR
Klarissabeto cancelled her appointment today as she does not have transportation to get here.    Message confirmed with caller   MS RN NOTE:



PATIENT RESTING IN BED, NO ACUTE DISTRESS NOTED. BREATHING EVEN AND UNLABORED, NO SOB NOTED. 
IV TO RIGHT HAND IN PLACE. PATIENT BLOOD SUGAR LEVEL 138 MG/DL, NO INSULIN GIVEN DUE TO POOR 
APPETITE, NO S/S OF HYPER/HYPOGLYCEMIA NOTED. BED LOCKED AND IN LOWEST POSITION, CALL LIGHT 
IN REACH. WILL ENDORSE TO DAY NURSE TO CONTINUE WITH PLAN OF CARE.
